# Patient Record
(demographics unavailable — no encounter records)

---

## 2025-01-29 NOTE — HISTORY OF PRESENT ILLNESS
[FreeTextEntry1] : 67 y/o F w / Hx of graves disease, DM2 HTN HLD  initial evaluation and management of thyroid/diabetes generally feels well and endorses no acute complaints. reports graves disease diagnosis in 2019 after ep of unintentional weight loss and chronic loose stools. reports MTX initiation, has been titrated ~5-15 mg daily over the years. lately at 10 mg, w/ euthyroid state confirmed as of 11/2023. US w/o solid nodules on 2024. reports chronic loose stools but weight stable. on max dose metformin. UTD w/ endoscopic studies w/o IBD per pt.   1/2025 Here for /fu, generally feels well and endorses no acute complaints. No interval events since LV. Today reports compliance and adequate tolerance w/o s/e of metformin and methimazole as instructed.   She otherwise denies any f/c, CP, SOB, palpitations, tremors, depressed mood, anxiety, palpitations, n/v, stool/urinary abn, skin/weight changes, heat/cold intolerance, HAs, breast/nipple changes, polyuria/polydipsia/nocturia or other complaints. she again denies any dysphagia, hoarseness, neck tenderness or new palpable masses. she again denies any family history of thyroid disorders or personal exposure to ionizing radiation.

## 2025-01-29 NOTE — ASSESSMENT
[Long Term Vascular Complications] : long term vascular complications of diabetes [Carbohydrate Consistent Diet] : carbohydrate consistent diet [Importance of Diet and Exercise] : importance of diet and exercise to improve glycemic control, achieve weight loss and improve cardiovascular health [Methimazole Therapy/PTU Therapy] : Risks and benefits of methimazole therapy/PTU therapy were discussed with the patient, including rash, liver dysfunction, and agranulocytosis. Patient was instructed to call the office for flu-like symptoms (e.g. fever and sore-throat) [FreeTextEntry1] : 1) DM2: now controlled, A1C target of <7%. Natural hx of the disease and importance of treatment targets discussed at length, she verbalized understanding. ADA diet and importance of exercise discussed at length. Plan is to increase metformin to full dose and introduce GLP-1 agonist today (Trulicity). Reduce Lantus to 18 units QHS, titration instructions provided. Refer to Nutritionist today. We tang check microalbumin, lipids and labs on the NV. Discuss vaccines and podiatry/opthalmology referrals on NV (eye exam referral provided today.  2) Now appears euthyroid. WE discussed all options for reestablishing euthyroid state, including TT vs. FERGUSON. vs. long term CHIANG use, and their respective r/b. After discussion, she is not  interested on definitive Rx, We will continue  CHIANG at this time, . Verbalized understanding and agrees with treatment plan, will contact MD and seek emergency medical care if condition changes, we reviewed the sign/symptoms or worsening thyrotoxicosis and thyroid storm.  3) Essential HTN: Pt is at goal BP and on an ACE inh. Reassess microalbumin prior to the NV.   4) Dyslipidemia: Pt is not on a moderate intensity statin. on fenofibrate REassess lipids on the next visit. LDL target <100.